# Patient Record
Sex: FEMALE | Race: WHITE | NOT HISPANIC OR LATINO | Employment: FULL TIME | URBAN - METROPOLITAN AREA
[De-identification: names, ages, dates, MRNs, and addresses within clinical notes are randomized per-mention and may not be internally consistent; named-entity substitution may affect disease eponyms.]

---

## 2024-02-03 ENCOUNTER — APPOINTMENT (OUTPATIENT)
Dept: GENERAL RADIOLOGY | Facility: HOSPITAL | Age: 39
End: 2024-02-03
Payer: COMMERCIAL

## 2024-02-03 ENCOUNTER — HOSPITAL ENCOUNTER (EMERGENCY)
Facility: HOSPITAL | Age: 39
Discharge: HOME | End: 2024-02-03
Attending: EMERGENCY MEDICINE
Payer: COMMERCIAL

## 2024-02-03 VITALS
BODY MASS INDEX: 25.01 KG/M2 | RESPIRATION RATE: 20 BRPM | DIASTOLIC BLOOD PRESSURE: 89 MMHG | OXYGEN SATURATION: 100 % | HEIGHT: 68 IN | WEIGHT: 165 LBS | HEART RATE: 127 BPM | SYSTOLIC BLOOD PRESSURE: 169 MMHG | TEMPERATURE: 97 F

## 2024-02-03 DIAGNOSIS — S92.312A CLOSED DISPLACED FRACTURE OF FIRST METATARSAL BONE OF LEFT FOOT, INITIAL ENCOUNTER: Primary | ICD-10-CM

## 2024-02-03 PROCEDURE — 99283 EMERGENCY DEPT VISIT LOW MDM: CPT

## 2024-02-03 PROCEDURE — 73630 X-RAY EXAM OF FOOT: CPT

## 2024-02-03 RX ORDER — HYDROCODONE BITARTRATE AND ACETAMINOPHEN 5; 325 MG/1; MG/1
1 TABLET ORAL ONCE AS NEEDED
Status: COMPLETED | OUTPATIENT
Start: 2024-02-03 | End: 2024-02-03

## 2024-02-03 RX ORDER — HYDROCODONE BITARTRATE AND ACETAMINOPHEN 7.5; 325 MG/1; MG/1
1 TABLET ORAL EVERY 4 HOURS PRN
Qty: 10 TABLET | Refills: 0 | Status: SHIPPED | OUTPATIENT
Start: 2024-02-03

## 2024-02-03 RX ADMIN — HYDROCODONE BITARTRATE AND ACETAMINOPHEN 1 TABLET: 5; 325 TABLET ORAL at 06:15

## 2024-02-03 NOTE — DISCHARGE INSTRUCTIONS
Ice pack, elevation, nonweightbearing with crutches and postop shoe until cleared by podiatry.  Follow-up with podiatry next week.  Return for any concerns.

## 2024-02-03 NOTE — ED PROVIDER NOTES
"Subjective   History of Present Illness  38-year-old female states she has some left foot pain since slipping and falling in the bathroom at the [employer].  She reports no other injury she denies head injury or neck or back pain or numbness or weakness.  Review of Systems    No past medical history on file.    No Known Allergies    No past surgical history on file.    No family history on file.    Social History     Socioeconomic History    Marital status:      Prior to Admission medications    Not on File       /89   Pulse (!) 127   Temp 97 °F (36.1 °C) (Oral)   Resp 20   Ht 172.7 cm (68\")   Wt 74.8 kg (165 lb)   LMP 02/03/2024 (Within Days)   SpO2 100%   BMI 25.09 kg/m²       Objective   Physical Exam  General: Well-appearing, no acute distress  Psych: Oriented, pleasant affect  Respirations: Clear, nonlabored respirations  Skin: No rash, normal color  Extremity: There is some mild soft tissue swelling on the dorsal aspect of the left midfoot, there is no heel tenderness or Achilles tenderness, ankle is nontender, she has some tenderness palpation across the dorsal midfoot, there is no plantar surface injury, the toes appear normal she has normal flexion extension function intact and normal sensation, no open wounds, normal pulses and brisk cap refill  Procedures           ED Course                                 XR Foot 3+ View Left    Result Date: 2/3/2024  Impression: Comminuted and minimally displaced fracture of the first metatarsal. Electronically Signed: Jayant Tirado MD  2/3/2024 5:49 AM EST  Workstation ID: [id number]             Medical Decision Making  Patient was advised of findings.  Notably she has normal neurovascular exam of the extremity.  There are no open wounds.  She is prescribed Norco for discomfort.  She was ordered postop shoe and crutches and referred to podiatry.  Patient is agreeable plan she is discharged in good condition.    Problems Addressed:  Closed " displaced fracture of first metatarsal bone of left foot, initial encounter: complicated acute illness or injury    Amount and/or Complexity of Data Reviewed  Radiology: ordered and independent interpretation performed.     Details: My independent interpretation of x-ray image of the left foot first metatarsal fracture    Risk  Prescription drug management.        Final diagnoses:   Closed displaced fracture of first metatarsal bone of left foot, initial encounter       ED Disposition  ED Disposition       ED Disposition   Discharge    Condition   Stable    Comment   --               FRANKIE Rivera DPM  [address] [place] [address] [phone]    Schedule an appointment as soon as possible for a visit in 3 days           Medication List        New Prescriptions      HYDROcodone-acetaminophen 7.5-325 MG per tablet  Commonly known as: NORCO  Take 1 tablet by mouth Every 4 (Four) Hours As Needed for Moderate Pain.               Where to Get Your Medications        These medications were sent to [facility] [id number] - [place], [place] - [address] AT [facility] [place] [facility] [place] - [phone]  - [phone] FX  [address] [place] [address] [place] [address]      Phone: [phone]   HYDROcodone-acetaminophen 7.5-325 MG per tablet            Jose Carlos Caputo MD  02/03/24 2450